# Patient Record
Sex: MALE | Race: BLACK OR AFRICAN AMERICAN | NOT HISPANIC OR LATINO | URBAN - METROPOLITAN AREA
[De-identification: names, ages, dates, MRNs, and addresses within clinical notes are randomized per-mention and may not be internally consistent; named-entity substitution may affect disease eponyms.]

---

## 2022-08-11 ENCOUNTER — EMERGENCY (EMERGENCY)
Facility: HOSPITAL | Age: 47
LOS: 1 days | Discharge: HOME | End: 2022-08-11
Attending: EMERGENCY MEDICINE | Admitting: EMERGENCY MEDICINE

## 2022-08-11 VITALS
HEART RATE: 51 BPM | WEIGHT: 160.06 LBS | SYSTOLIC BLOOD PRESSURE: 110 MMHG | OXYGEN SATURATION: 98 % | RESPIRATION RATE: 18 BRPM | DIASTOLIC BLOOD PRESSURE: 70 MMHG | TEMPERATURE: 98 F

## 2022-08-11 DIAGNOSIS — F19.10 OTHER PSYCHOACTIVE SUBSTANCE ABUSE, UNCOMPLICATED: ICD-10-CM

## 2022-08-11 DIAGNOSIS — F11.93 OPIOID USE, UNSPECIFIED WITH WITHDRAWAL: ICD-10-CM

## 2022-08-11 DIAGNOSIS — R11.2 NAUSEA WITH VOMITING, UNSPECIFIED: ICD-10-CM

## 2022-08-11 PROCEDURE — 99284 EMERGENCY DEPT VISIT MOD MDM: CPT

## 2022-08-11 RX ORDER — FAMOTIDINE 10 MG/ML
20 INJECTION INTRAVENOUS ONCE
Refills: 0 | Status: COMPLETED | OUTPATIENT
Start: 2022-08-11 | End: 2022-08-11

## 2022-08-11 RX ORDER — ONDANSETRON 8 MG/1
4 TABLET, FILM COATED ORAL ONCE
Refills: 0 | Status: COMPLETED | OUTPATIENT
Start: 2022-08-11 | End: 2022-08-11

## 2022-08-11 RX ADMIN — FAMOTIDINE 20 MILLIGRAM(S): 10 INJECTION INTRAVENOUS at 11:52

## 2022-08-11 RX ADMIN — Medication 30 MILLILITER(S): at 11:51

## 2022-08-11 RX ADMIN — Medication 0.1 MILLIGRAM(S): at 11:52

## 2022-08-11 RX ADMIN — ONDANSETRON 4 MILLIGRAM(S): 8 TABLET, FILM COATED ORAL at 11:53

## 2022-08-11 NOTE — ED ADULT NURSE NOTE - CHIEF COMPLAINT QUOTE
Pt is [facility] custody, c/o heroin withdrawal accompanied by nausea. Last use was yesterday

## 2022-08-11 NOTE — ED PROVIDER NOTE - NS ED ROS FT
CONST: +tremors, no fevers, no chills  ENT: no sore throat  CV: no chest pain, no leg swelling  RESP: no shortness of breath, no cough  ABD: no abdominal pain, +nausea, +vomiting, +diarrhea  : no dysuria, no flank pain, no hematuria  MSK: no back pain, no extremity pain  SKIN:  no rash

## 2022-08-11 NOTE — ED PROVIDER NOTE - OBJECTIVE STATEMENT
47M [facility] IVDU (heroin, last use 10 hrs ago) p/w heroin w/drawal symptoms--tremors, diarrhea, vomiting for the past 2 hrs. No f/c, chest pain, SOB, abd pain, leg swelling 47M PM[facility] IVDU (heroin, last use 10 hrs ago) presenting in [facility] custody w/ heroin withdrawal symptoms--tremors, diarrhea, vomiting for the past 2 hrs. No f/c, chest pain, SOB, abd pain, leg swelling

## 2022-08-11 NOTE — ED PROVIDER NOTE - NSFOLLOWUPINSTRUCTIONS_ED_ALL_ED_FT
Patient medically cleared for arraignment    Follow up with your doctor in 1 week for further evaluation of your symptoms    Return to the Emergency Department if you have any new or worsening symptoms Patient medically cleared for arraignment.    Follow up with your doctor in 1 week for further evaluation of your symptoms.     Return to the Emergency Department if you have any new or worsening symptoms.

## 2022-08-11 NOTE — ED PROVIDER NOTE - CLINICAL SUMMARY MEDICAL DECISION MAKING FREE TEXT BOX
47M p/w heroin w/drawal symptoms--diarrhea, vomiting, tremors. VSS in ED. Not anxious appearing, lungs ctab, abd NT, no leg swelling  Will symptomatically treat with clonidine, zofran, GI cocktail, reassess symptoms

## 2022-08-11 NOTE — ED PROVIDER NOTE - PHYSICAL EXAMINATION
Physical Exam:  Gen: not anxious appearing, NAD, non-toxic appearing, awake alert   HEENT:  normal conjunctiva, oral mucosa moist  Lung: CTAB, no respiratory distress  CV: RRR  Abd: soft, NT, ND  MSK: no visible deformities, no tremors noted  Skin: Warm, well perfused, track marks on L UE, no leg swelling  ~Byron Daley MD (PGY-3)

## 2022-08-11 NOTE — ED ADULT TRIAGE NOTE - CHIEF COMPLAINT QUOTE
Pt is [facility] custody, c/o heroin withdrawal accompanied by nausea. Last use was yesterday

## 2022-08-11 NOTE — ED ADULT NURSE NOTE - OBJECTIVE STATEMENT
BIBEMS with [employer] for c/o nausea and mild abd pain with concerns for heorin withdrawal ,last  use last night. Denies vomiting/tremors/diarrhea. Denies CP/SOB/weakness/dizziness/tingling/cough/fevers/known sick contacts.    On assessment- AOx4, breathing even and unlabored on RA, no apparent distress, VSS in triage, able to speak in clear coherent sentences, steady gait unassisted, neuro intact with no apparent facial asymmetry, PERRLA.

## 2022-08-11 NOTE — ED PROVIDER NOTE - PATIENT PORTAL LINK FT
You can access the FollowMyHealth Patient Portal offered by [facility] by registering at the following website: [email or web address]. By joining [facility]’s FollowMyHealth portal, you will also be able to view your health information using other applications (apps) compatible with our system.

## 2023-05-20 ENCOUNTER — HOSPITAL ENCOUNTER (INPATIENT)
Dept: HOSPITAL 74 - YASAS | Age: 48
LOS: 6 days | Discharge: OTHER INSTITUTION | DRG: 773 | End: 2023-05-26
Attending: SURGERY | Admitting: ALLERGY & IMMUNOLOGY
Payer: COMMERCIAL

## 2023-05-20 VITALS — BODY MASS INDEX: 29 KG/M2

## 2023-05-20 DIAGNOSIS — F14.20: ICD-10-CM

## 2023-05-20 DIAGNOSIS — Z28.310: ICD-10-CM

## 2023-05-20 DIAGNOSIS — F19.24: ICD-10-CM

## 2023-05-20 DIAGNOSIS — F17.210: ICD-10-CM

## 2023-05-20 DIAGNOSIS — Z28.9: ICD-10-CM

## 2023-05-20 DIAGNOSIS — F12.20: ICD-10-CM

## 2023-05-20 DIAGNOSIS — F11.23: Primary | ICD-10-CM

## 2023-05-20 PROCEDURE — U0003 INFECTIOUS AGENT DETECTION BY NUCLEIC ACID (DNA OR RNA); SEVERE ACUTE RESPIRATORY SYNDROME CORONAVIRUS 2 (SARS-COV-2) (CORONAVIRUS DISEASE [COVID-19]), AMPLIFIED PROBE TECHNIQUE, MAKING USE OF HIGH THROUGHPUT TECHNOLOGIES AS DESCRIBED BY CMS-2020-01-R: HCPCS

## 2023-05-20 PROCEDURE — U0005 INFEC AGEN DETEC AMPLI PROBE: HCPCS

## 2023-05-21 PROCEDURE — HZ2ZZZZ DETOXIFICATION SERVICES FOR SUBSTANCE ABUSE TREATMENT: ICD-10-PCS | Performed by: SURGERY

## 2023-05-21 RX ADMIN — HYDROXYZINE PAMOATE PRN MG: 25 CAPSULE ORAL at 15:00

## 2023-05-21 RX ADMIN — Medication SCH MG: at 22:02

## 2023-05-21 RX ADMIN — IBUPROFEN PRN MG: 600 TABLET, FILM COATED ORAL at 05:32

## 2023-05-21 RX ADMIN — Medication SCH TAB: at 10:33

## 2023-05-21 RX ADMIN — HYDROXYZINE PAMOATE PRN MG: 25 CAPSULE ORAL at 02:46

## 2023-05-22 RX ADMIN — Medication SCH: at 23:14

## 2023-05-22 RX ADMIN — Medication SCH MG: at 23:18

## 2023-05-22 RX ADMIN — Medication SCH: at 23:13

## 2023-05-22 RX ADMIN — HYDROXYZINE PAMOATE PRN MG: 25 CAPSULE ORAL at 23:22

## 2023-05-22 RX ADMIN — Medication SCH: at 10:10

## 2023-05-22 RX ADMIN — Medication SCH MG: at 23:20

## 2023-05-22 RX ADMIN — IBUPROFEN PRN MG: 600 TABLET, FILM COATED ORAL at 23:22

## 2023-05-22 RX ADMIN — HYDROXYZINE PAMOATE PRN MG: 25 CAPSULE ORAL at 01:44

## 2023-05-23 RX ADMIN — Medication SCH: at 10:37

## 2023-05-23 RX ADMIN — Medication SCH MG: at 22:33

## 2023-05-23 RX ADMIN — HYDROXYZINE PAMOATE PRN MG: 25 CAPSULE ORAL at 22:33

## 2023-05-24 RX ADMIN — Medication SCH: at 10:40

## 2023-05-24 RX ADMIN — HYDROXYZINE PAMOATE PRN MG: 25 CAPSULE ORAL at 22:24

## 2023-05-24 RX ADMIN — Medication SCH MG: at 22:23

## 2023-05-24 RX ADMIN — Medication SCH MG: at 22:24

## 2023-05-24 RX ADMIN — HYDROXYZINE PAMOATE PRN MG: 25 CAPSULE ORAL at 10:39

## 2023-05-25 VITALS — RESPIRATION RATE: 18 BRPM

## 2023-05-25 RX ADMIN — Medication SCH MG: at 22:25

## 2023-05-25 RX ADMIN — Medication SCH MG: at 22:26

## 2023-05-25 RX ADMIN — HYDROXYZINE PAMOATE PRN MG: 25 CAPSULE ORAL at 22:26

## 2023-05-25 RX ADMIN — HYDROXYZINE PAMOATE PRN MG: 25 CAPSULE ORAL at 10:20

## 2023-05-25 RX ADMIN — Medication SCH TAB: at 10:20

## 2023-05-26 VITALS — TEMPERATURE: 98.1 F | HEART RATE: 90 BPM | DIASTOLIC BLOOD PRESSURE: 81 MMHG | SYSTOLIC BLOOD PRESSURE: 144 MMHG

## 2023-05-26 RX ADMIN — Medication SCH TAB: at 09:01

## 2023-05-26 RX ADMIN — HYDROXYZINE PAMOATE PRN MG: 25 CAPSULE ORAL at 09:01

## 2023-05-26 RX ADMIN — IBUPROFEN PRN MG: 600 TABLET, FILM COATED ORAL at 05:41
